# Patient Record
Sex: MALE | Race: WHITE
[De-identification: names, ages, dates, MRNs, and addresses within clinical notes are randomized per-mention and may not be internally consistent; named-entity substitution may affect disease eponyms.]

---

## 2017-09-15 ENCOUNTER — HOSPITAL ENCOUNTER (EMERGENCY)
Dept: HOSPITAL 74 - FER | Age: 62
Discharge: HOME | End: 2017-09-15
Payer: COMMERCIAL

## 2017-09-15 VITALS — BODY MASS INDEX: 25.4 KG/M2

## 2017-09-15 VITALS — DIASTOLIC BLOOD PRESSURE: 88 MMHG | HEART RATE: 63 BPM | SYSTOLIC BLOOD PRESSURE: 155 MMHG

## 2017-09-15 VITALS — TEMPERATURE: 98.2 F

## 2017-09-15 DIAGNOSIS — R51: Primary | ICD-10-CM

## 2017-09-15 DIAGNOSIS — I10: ICD-10-CM

## 2017-09-15 LAB
ALBUMIN SERPL-MCNC: 4.5 G/DL (ref 3.5–5)
ALP SERPL-CCNC: 61 U/L (ref 32–92)
ALT SERPL-CCNC: 27 U/L (ref 10–40)
ANION GAP SERPL CALC-SCNC: 4 MMOL/L (ref 8–16)
AST SERPL-CCNC: 24 U/L (ref 10–42)
BASOPHILS # BLD: 1.3 % (ref 0–2)
BILIRUB SERPL-MCNC: 1.5 MG/DL (ref 0.2–1)
CALCIUM SERPL-MCNC: 9 MG/DL (ref 8.4–10.2)
CK SERPL-CCNC: 102 IU/L (ref 39–308)
CO2 SERPL-SCNC: 26 MMOL/L (ref 22–28)
CREAT SERPL-MCNC: 0.9 MG/DL (ref 0.6–1.3)
DEPRECATED RDW RBC AUTO: 11.7 % (ref 11.9–15.9)
EOSINOPHIL # BLD: 5.2 % (ref 0–4.5)
GLUCOSE SERPL-MCNC: 97 MG/DL (ref 74–106)
MCH RBC QN AUTO: 29 PG (ref 25.7–33.7)
MCHC RBC AUTO-ENTMCNC: 33.5 G/DL (ref 32–35.9)
MCV RBC: 86.5 FL (ref 80–96)
NEUTROPHILS # BLD: 68.1 % (ref 42.8–82.8)
PLATELET # BLD AUTO: 169 K/MM3 (ref 134–434)
PMV BLD: 9.5 FL (ref 7.5–11.1)
PROT SERPL-MCNC: 6.9 G/DL (ref 6.4–8.3)
TROPONIN I SERPL-MCNC: < 0.03 NG/ML (ref 0.03–0.5)
WBC # BLD AUTO: 5 K/MM3 (ref 4–10.8)

## 2017-09-15 PROCEDURE — 3E033GC INTRODUCTION OF OTHER THERAPEUTIC SUBSTANCE INTO PERIPHERAL VEIN, PERCUTANEOUS APPROACH: ICD-10-PCS | Performed by: EMERGENCY MEDICINE

## 2017-09-15 PROCEDURE — 3E0337Z INTRODUCTION OF ELECTROLYTIC AND WATER BALANCE SUBSTANCE INTO PERIPHERAL VEIN, PERCUTANEOUS APPROACH: ICD-10-PCS | Performed by: EMERGENCY MEDICINE

## 2017-09-15 NOTE — PDOC
History of Present Illness





- General


Chief Complaint: Headache


Stated Complaint: HEAD ACHE,NECK PAINA AND ELEVATED BLOOD PRESSURE


Time Seen by Provider: 09/15/17 10:38





- History of Present Illness


Initial Comments: 





09/15/17 10:59


61 M with h/o HTN, BPH presents to ER with 2 weeks of intermittent headaches. 

Pt states that the headaches generally start in the back of the head and 

migrate forward bilaterally. He states that they are typically gradual onset, 

without thunderclap. Denies neck stiffness, denies F/C. Pt states that he has 

been taking motrin, which helps significantly. The headaches usually resolve 

completely after taking motrin. However, they return after a few hours.


Pt also notes that his BP has been occasionally elevated. He reports 

occasionally measuring his BP in the 190s systolic. He denies CP/SOB. Does not 

know if there is an association with his elevated BP and his headaches.


Pt denies N/V. Denies weakness/numbness/tingling in any extremity. Denies 

dizziness. Denies visual changes.


Notably, pt reports that he has been cutting down on his caffeine intake. He 

typically drinks 2 large cups of coffee per day, but over the past 2 weeks has 

only been drinking 1 cup. Pt endorses social ETOH intake but denies any other 

substances.





Past History





- Past Medical History


Allergies/Adverse Reactions: 


 Allergies











Allergy/AdvReac Type Severity Reaction Status Date / Time


 


No Known Allergies Allergy   Verified 09/15/17 10:36











Home Medications: 


Ambulatory Orders





Losartan Potassium 50 mg PO DAILY 09/15/17 


Tamsulosin HCl [Flomax] 0.4 mg PO DAILY 09/15/17 








HTN: Yes





- Psycho/Social/Smoking Cessation Hx


Anxiety: No


Suicidal Ideation: No


Smoking History: Never smoked


Have you smoked in the past 12 months: No


Information on smoking cessation initiated: No


Hx Alcohol Use: No


Drug/Substance Use Hx: No


Substance Use Type: None





**Review of Systems





- Review of Systems


Comments:: 





09/15/17 11:06


"GENERAL/CONSTITUTIONAL: No fever or chills. No weakness.


HEAD, EYES, EARS, NOSE AND THROAT: No change in vision. No ear pain or 

discharge. No sore throat.


CARDIOVASCULAR: No chest pain or shortness of breath.


RESPIRATORY: No cough, wheezing, or hemoptysis.


GASTROINTESTINAL: No nausea, vomiting, diarrhea or constipation.


GENITOURINARY: No dysuria, frequency, or change in urination.


MUSCULOSKELETAL: No joint or muscle swelling or pain. No neck or back pain.


SKIN: No rash


NEUROLOGIC: + headache, no vertigo, loss of consciousness, or change in strength

/sensation.


ENDOCRINE: No increased thirst. No abnormal weight change.


HEMATOLOGIC/LYMPHATIC: No anemia, easy bleeding, or history of blood clots.


ALLERGIC/IMMUNOLOGIC: No hives or skin allergy.


"





*Physical Exam





- Vital Signs


 Last Vital Signs











Temp Pulse Resp BP Pulse Ox


 


 98.2 F   78   18   146/96   100 


 


 09/15/17 10:36  09/15/17 10:36  09/15/17 10:36  09/15/17 10:36  09/15/17 10:36














- Physical Exam


Comments: 


09/15/17 11:06


"GENERAL: Awake, alert, and fully oriented, in no acute distress


HEAD: No signs of trauma


EYES: PERRLA, EOMI, sclera anicteric, conjunctiva clear


ENT: Auricles normal inspection, hearing grossly normal, nares patent, 

oropharynx clear without exudates. Moist mucosa


NECK: Normal ROM, no tenderness, no bruit, supple, no lymphadenopathy, JVD, or 

masses


LUNGS: Breath sounds equal, clear to auscultation bilaterally.  No wheezes, and 

no crackles


HEART: Regular rate and rhythm, normal S1 and S2, no murmurs, rubs or gallops


ABDOMEN: Soft, nontender, normoactive bowel sounds.  No guarding, no rebound.  

No masses


EXTREMITIES: Equal pulses, Normal range of motion, no edema.  No clubbing or 

cyanosis. No cords, erythema, or tenderness


NEUROLOGICAL: Cranial nerves II through XII intact. 5/5 strength and sensation 

in all extremities, visual fields intact, cerebellar function normal. Normal 

speech, normal gait


SKIN: Warm, Dry, normal turgor, no rashes or lesions noted.








ED Treatment Course





- LABORATORY


CBC & Chemistry Diagram: 


 09/15/17 11:15





 09/15/17 11:15





- RADIOLOGY


Radiology Studies Ordered: 














 Category Date Time Status


 


 HEAD CT WITHOUT CONTRAST [CT] Stat CT Scan  09/15/17 10:57 Ordered














Medical Decision Making





- Medical Decision Making


09/15/17 11:08


61 M with intermittent headaches x 2 weeks. Possible caffeine withdrawal 

headaches, as timing coincides with pt reducing coffee intake. Pt with no neuro 

deficits or infectious symptoms to suggest meningitis or CNS process. No red 

flags such as thunderclap headache or worst headache of life or neck stiffness. 

No carotid bruit to suggest dissection.


- Labs


- CTH


- Fioricet, reglan, IVF








09/15/17 12:12


Pt reassessed after IVF and meds. Now with complete resolution of headache.


CTH negative. Labs unremarkable.


Pt clinically stable for DC. To f/u with PMD and neuro.





*DC/Admit/Observation/Transfer


Diagnosis at time of Disposition: 


 Headache





- Discharge Dispostion


Disposition: HOME


Condition at time of disposition: Good





- Patient Instructions


Printed Discharge Instructions:  DI for Headache


Additional Instructions: 


Please follow up with your primary care doctor within 1 week for a re-

evaluation. You will need a referral to a neurologist for further work up of 

your headache, including an MRI of your head and neck.


You will also need to follow up with your primary doctor regarding your blood 

pressure. It was normal today, but if it becomes persistently elevated, it can 

lead to heart disease or kidney damage.


If you experience worsening or persistent headache, neck stiffness, fevers, 

nausea, vomiting, chest pain, or any other concerning symptoms, return 

immediately to the ER.





- Attestations


Physician Attestion: 





09/15/17 12:18








I, Dr. Andrea Beck MD, attest that this document has been prepared under my 

direction and personally reviewed by me in its entirety.   I further attest, 

that it accurately reflects all work, treatment, procedures and medical decision

-making performed by me.

## 2017-09-17 NOTE — EKG
Test Reason : 

Blood Pressure : ***/*** mmHG

Vent. Rate : 059 BPM     Atrial Rate : 059 BPM

   P-R Int : 156 ms          QRS Dur : 090 ms

    QT Int : 422 ms       P-R-T Axes : 064 038 047 degrees

   QTc Int : 417 ms

 

SINUS BRADYCARDIA

OTHERWISE NORMAL ECG

NO PREVIOUS ECGS AVAILABLE

Confirmed by JOSE LUIS VEGA MD ([id number]) on 9/17/2017 8:07:59 AM

 

Referred By: YOSVANY SERVIN           Confirmed By:JOSE LUIS VEGA MD

## 2018-06-16 ENCOUNTER — HOSPITAL ENCOUNTER (EMERGENCY)
Dept: HOSPITAL 74 - FER | Age: 63
Discharge: HOME | End: 2018-06-16
Payer: COMMERCIAL

## 2018-06-16 VITALS — HEART RATE: 85 BPM | DIASTOLIC BLOOD PRESSURE: 89 MMHG | SYSTOLIC BLOOD PRESSURE: 157 MMHG | TEMPERATURE: 97.8 F

## 2018-06-16 VITALS — BODY MASS INDEX: 25.1 KG/M2

## 2018-06-16 DIAGNOSIS — Z87.891: ICD-10-CM
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
## 2018-06-16 DIAGNOSIS — S60.021A: Primary | ICD-10-CM

## 2018-06-16 DIAGNOSIS — I10: ICD-10-CM
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
## 2018-06-16 NOTE — PDOC
History of Present Illness





- General


Chief Complaint: Injury


Stated Complaint: RIGHT INDEX FINGER INJURY


Time Seen by Provider: 06/16/18 12:22


History Source: Patient


Exam Limitations: No Limitations





- History of Present Illness


Initial Comments: 





06/16/18 13:16


62y M hx of htn presents with R index finger pain. The patient was closing his 

car door and was distracted and got his R index finger caught in the closing 

door yesterday. Pt notes some tingling, but notes pain is minimal yesterday - 

was able to flex/extend it. Today he ntoes there was inreased breuising, 

swelling, and notes it is difficult to flex/extend it as it feels tight. pt 

notes some mild bleeding at the cuticle.  





no other injuries.








Past History





- Past Medical History


Allergies/Adverse Reactions: 


 Allergies











Allergy/AdvReac Type Severity Reaction Status Date / Time


 


No Known Allergies Allergy   Verified 06/16/18 12:18











Home Medications: 


Ambulatory Orders





Losartan Potassium 50 mg PO DAILY 09/15/17 


Amlodipine Besylate [Norvasc -] 5 mg PO DAILY 06/16/18 








COPD: No


HTN: Yes





- Immunization History


TDAP Vaccination: Yes





- Suicide/Smoking/Psychosocial Hx


Smoking History: Never smoked


Have you smoked in the past 12 months: No


If you are a former smoker, when did you quit?: 21 YEARS


Information on smoking cessation initiated: No


Hx Alcohol Use: Yes (SOCIAL)


Drug/Substance Use Hx: No


Substance Use Type: None





**Review of Systems





- Review of Systems


Able to Perform ROS?: Yes


Comments:: 





06/16/18 13:17





Musculskelatal -+R finger pain / swelling no reported back pain, 


skin - +Bleeding no reported bruising, erythema, rash


neurological: + tingling, no reported headache, numbness, focal weakness, ataxia

, 


hematologic: no reported  easy bruising, easy bleeding








*Physical Exam





- Vital Signs


 Last Vital Signs











Temp Pulse Resp BP Pulse Ox


 


 97.8 F   85   16   157/89   100 


 


 06/16/18 12:17  06/16/18 12:17  06/16/18 12:17  06/16/18 12:17  06/16/18 12:17














- Physical Exam


Comments: 





06/16/18 13:18


GENERAL: The patient is awake, alert, and fully oriented, Nontoxic - in no 

acute distress.


EXTREMITIES: no focal tenderness on R hand, R index finger - mild diffuse 

tenderness and swelling, ecchymotic, no focal bony tendeness, able to flex/

extend with pain, mild controlled bleeding at skin break on cuticle of R, no 

signs of subungal hematoma, sensation intact





ED Treatment Course





- RADIOLOGY


Radiology Studies Ordered: 














 Category Date Time Status


 


 FINGER(S) RIGHT [RAD] Stat Radiology  06/16/18 13:16 Ordered














Medical Decision Making





- Medical Decision Making





06/16/18 13:19


xray to fo fx








*DC/Admit/Observation/Transfer


Diagnosis at time of Disposition: 


Finger contusion


Qualifiers:


 Encounter type: initial encounter Finger: index finger Damage to nail status: 

without damage Laterality: right Qualified Code(s): S60.021A - Contusion of 

right index finger without damage to nail, initial encounter








- Discharge Dispostion


Disposition: HOME


Condition at time of disposition: Improved


Decision to Admit order: No





- Referrals


Referrals: 


Fidencio Ashford [Primary Care Provider] - 





- Patient Instructions


Printed Discharge Instructions:  DI for Finger Sprain


Additional Instructions: 


Return to the emergency department immediately with ANY new, persistent or 

worsening symptoms during any worsening pain, numbness, tingling or any other 

concerns. 





Take Motrin or ibuprofen or Tylenol as needed for your pain. Keep your arm 

elevated to minimize any swelling. 





You MUST call and follow up with your doctor in 3-4 days for further evaluation 

of your symptoms. Results were discussed with you. Please make sure your doctor 

reviews the results of your emergency evaluation.








Print Language: ENGLISH





- Post Discharge Activity

## 2019-10-28 ENCOUNTER — HOSPITAL ENCOUNTER (EMERGENCY)
Dept: HOSPITAL 74 - FER | Age: 64
Discharge: HOME | End: 2019-10-28
Payer: COMMERCIAL

## 2019-10-28 VITALS — HEART RATE: 79 BPM | DIASTOLIC BLOOD PRESSURE: 94 MMHG | SYSTOLIC BLOOD PRESSURE: 173 MMHG | TEMPERATURE: 97.6 F

## 2019-10-28 VITALS — BODY MASS INDEX: 25.1 KG/M2
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
## 2019-10-28 DIAGNOSIS — M79.671: Primary | ICD-10-CM

## 2019-10-28 DIAGNOSIS — S99.921A: ICD-10-CM
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
## 2019-10-28 DIAGNOSIS — I10: ICD-10-CM

## 2019-10-28 DIAGNOSIS — Z87.891: ICD-10-CM
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
## 2019-10-28 NOTE — PDOC
History of Present Illness





- General


Chief Complaint: Injury


Stated Complaint: RIGHT ANKLE INJURY


Time Seen by Provider: 10/28/19 09:08





- History of Present Illness


Initial Comments: 





10/28/19 09:56


63 years old with no past medical history presents to the ED with right foot 

injury.  Patient was at work dropped a fence on his foot while wearing a boot 

initially no significant pain later that day and yesterday has had pain for the 

last 2 days symptoms are mild to moderate persistent constant he has been able 

to ambulate comfortably














Past History





- Past Medical History


Allergies/Adverse Reactions: 


 Allergies











Allergy/AdvReac Type Severity Reaction Status Date / Time


 


No Known Allergies Allergy   Verified 10/28/19 08:57











Home Medications: 


Ambulatory Orders





Losartan Potassium 50 mg PO DAILY 09/15/17 


Amlodipine Besylate [Norvasc -] 5 mg PO DAILY 06/16/18 








COPD: No


HTN: Yes





- Immunization History


TDAP Vaccination: Yes





- Psycho Social/Smoking Cessation Hx


Smoking History: Never smoked


Have you smoked in the past 12 months: No


If you are a former smoker, when did you quit?: 21 YEARS


Hx Alcohol Use: Yes (SOCIAL)


Drug/Substance Use Hx: No


Substance Use Type: None





**Review of Systems





- Review of Systems


Comments:: 





10/28/19 09:56


ROS:  A complete review of 10 out of 10 review of systems is taken and is 

negative apart from what is previously mentioned below and in the HPI.








*Physical Exam





- Vital Signs


 Last Vital Signs











Temp Pulse Resp BP Pulse Ox


 


 97.6 F   79   18   173/94 H  100 


 


 10/28/19 08:56  10/28/19 08:56  10/28/19 08:56  10/28/19 08:56  10/28/19 08:56














- Physical Exam


Comments: 





10/28/19 09:56





Vitals: Triage Vital signs reviewed


General Appearance: No acute distress, well nourished well developed, 


Head: Atraumatic, 


Neck: Supple; no Nucal rigidity


Chest Wall: Nontender


Extremities: Full range of motion to all extremities, no cyanosis, clubbing, or 

edema


Skin: Warm and dry, no rashes or lesions, no rash, no petechiae


Psych: Normal mood, normal affect








ED Treatment Course





- RADIOLOGY


Radiology Studies Ordered: 














 Category Date Time Status


 


 ANKLE & FOOT-RIGHT* [RAD] Stat Radiology  10/28/19 09:08 Ordered














Medical Decision Making





- Medical Decision Making





10/28/19 09:57


No acute fracture dislocation noted on x-ray we will continue to recommend hard 

soled shoe orthopedic follow-up if persistent pain





Patient advised to limit ambulation and walking if having pain patient asking 

to return to work states he feels comfortable however should his pain return he 

is advised to keep off his foot and follow-up with orthopedics []





Findings, the need for follow-up and strict return instructions discussed with 

patient.





10/28/19 13:50








Discharge





- Discharge Information


Problems reviewed: Yes


Clinical Impression/Diagnosis: 


Foot injury


Qualifiers:


 Encounter type: initial encounter Laterality: right Qualified Code(s): 

S99.921A - Unspecified injury of right foot, initial encounter





Condition: Stable


Disposition: HOME





- Admission


No





- Follow up/Referral


Referrals: 


Fidencio Ashford [Primary Care Provider] - 


Bigg Duncan DO [Staff Physician] - 





- Patient Discharge Instructions


Patient Printed Discharge Instructions:  DI for Foot Sprain


Additional Instructions: 


Ice foot 20 minutes on 20 minutes off.  Hard soled shoe.  Okay to return to 

work.  If still having pain after 1 week follow-up with Dr. Duncan orthopedics.





- Post Discharge Activity


Work/Back to School Note:  Back to Work